# Patient Record
Sex: FEMALE | Race: OTHER | Employment: PART TIME | URBAN - METROPOLITAN AREA
[De-identification: names, ages, dates, MRNs, and addresses within clinical notes are randomized per-mention and may not be internally consistent; named-entity substitution may affect disease eponyms.]

---

## 2019-01-08 ENCOUNTER — OFFICE VISIT (OUTPATIENT)
Dept: FAMILY MEDICINE CLINIC | Age: 64
End: 2019-01-08

## 2019-01-08 VITALS
BODY MASS INDEX: 21.71 KG/M2 | SYSTOLIC BLOOD PRESSURE: 100 MMHG | RESPIRATION RATE: 16 BRPM | WEIGHT: 115 LBS | OXYGEN SATURATION: 95 % | TEMPERATURE: 97.9 F | HEART RATE: 87 BPM | HEIGHT: 61 IN | DIASTOLIC BLOOD PRESSURE: 66 MMHG

## 2019-01-08 DIAGNOSIS — Z00.00 PREVENTATIVE HEALTH CARE: ICD-10-CM

## 2019-01-08 DIAGNOSIS — Z13.228 SCREENING FOR METABOLIC DISORDER: ICD-10-CM

## 2019-01-08 DIAGNOSIS — E78.5 HYPERLIPIDEMIA, UNSPECIFIED HYPERLIPIDEMIA TYPE: ICD-10-CM

## 2019-01-08 DIAGNOSIS — Z76.89 ENCOUNTER TO ESTABLISH CARE: Primary | ICD-10-CM

## 2019-01-08 DIAGNOSIS — Z23 ENCOUNTER FOR IMMUNIZATION: ICD-10-CM

## 2019-01-08 DIAGNOSIS — Z86.39 HISTORY OF VITAMIN D DEFICIENCY: ICD-10-CM

## 2019-01-08 DIAGNOSIS — Z86.39 HISTORY OF THYROID DISEASE: ICD-10-CM

## 2019-01-08 NOTE — PROGRESS NOTES
OFFICE NOTE    Benito Nath is a 58 y.o. female presenting today for office visit. 1/11/2019  2:36 PM    Chief Complaint   Patient presents with    Establish Care       HPI: Patient is coming into the office to establish care with her daughter. Patient previous PCP Dr. Wanda WEEKS. Her previous PCP no longer accept her insurance. Last visit with PCP was 3 months ago. Patient has a history of Trigeminal neuralgia (right side of face) and is followed by Dr. Emiliana Hopper. She states she still has a hard time to swallow sometimes due to the trigeminal neuralgia. Patient states she has hypercholesterol that is diet controlled. She also has takes synthroid for hypothyroidism. No medication refills needed today. Denies chest pain, SOB, fever. Review of Systems   Constitutional: Negative for chills, fatigue and fever. HENT: Negative. Eyes: Negative. Respiratory: Negative for cough, shortness of breath and wheezing. Cardiovascular: Negative for chest pain, palpitations and leg swelling. Gastrointestinal: Negative. Endocrine: Negative. Genitourinary: Negative. Musculoskeletal:        Hands and finger stiffness bilaterally   Skin: Negative. Neurological: Negative for dizziness, tremors, weakness, light-headedness, numbness and headaches. Psychiatric/Behavioral: Negative.           PHQ Screening   PHQ over the last two weeks 1/8/2019   Little interest or pleasure in doing things Not at all   Feeling down, depressed, irritable, or hopeless Not at all   Total Score PHQ 2 0         History  Past Medical History:   Diagnosis Date    High cholesterol     DIET CONTROLLED    Thyroid disease     Trigeminal neuralgia of right side of face October 2014    V3 Branch       Past Surgical History:   Procedure Laterality Date    HX ORTHOPAEDIC      LEFT KNEE       Social History     Socioeconomic History    Marital status:      Spouse name: Not on file    Number of children: Not on file    Years of education: Not on file    Highest education level: Not on file   Social Needs    Financial resource strain: Not on file    Food insecurity - worry: Not on file    Food insecurity - inability: Not on file    Transportation needs - medical: Not on file   [employer] needs - non-medical: Not on file   Occupational History    Not on file   Tobacco Use    Smoking status: Never Smoker    Smokeless tobacco: Never Used   Substance and Sexual Activity    Alcohol use: No    Drug use: No    Sexual activity: Not on file   Other Topics Concern    Not on file   Social History Narrative    Not on file       No family history on file. No Known Allergies    Current Outpatient Medications   Medication Sig Dispense Refill    levothyroxine (SYNTHROID) 25 mcg tablet Take 25 mcg by mouth Daily (before breakfast).  nortriptyline (PAMELOR) 25 mg capsule Take 25 mg by mouth nightly.  temazepam (RESTORIL) 15 mg capsule Take 15 mg by mouth nightly.  raNITIdine hcl 150 mg capsule Take 150 mg by mouth two (2) times a day.  pregabalin (LYRICA) 150 mg capsule Take 150 mg by mouth two (2) times a day.  HYDROcodone-acetaminophen (NORCO) 5-325 mg per tablet Take 1-2 Tabs by mouth every four (4) hours as needed. Max Daily Amount: 12 Tabs. Indications: PAIN 20 Tab 0    pregabalin (LYRICA) 50 mg capsule Take 2 Caps by mouth two (2) times a day. Max Daily Amount: 200 mg. 60 Cap 1    dexamethasone (DECADRON) 2 mg tablet With food. Take 4mg (2 tab) by mouth every 6 hours for 3 doses (3/31 7pm, 4/1 1am, 7am), every 8 hrs x 3 (4/1 2pm, 10pm, 4/2 6am). Then 2mg (1 tab) by mouth every 6 hrs x 4 (4/2 12 noon, 6pm, midnight, 4/3 6am), every 8 hrs x 3 (4/3 2pm, 10pm, 4/4 6am), every 12 hrs x 2 (4/4 6pm, 4/5 6am),then stop. 21 Tab 0    cartilage-collagen II-hyaluron 40-10-3.3 mg tab Take  by mouth.            Health Maintenance reviewed - discussed with patient/daughter    Patient Care Team:  Patient Care Team:  Steve Vaughn MD as PCP - General (Internal Medicine)        LABS/Results:  Results for orders placed or performed during the hospital encounter of 03/29/16   CBC WITH AUTOMATED DIFF   Result Value Ref Range    WBC 9.2 4.6 - 13.2 K/uL    RBC 4.25 4.20 - 5.30 M/uL    HGB 12.3 12.0 - 16.0 g/dL    HCT 37.6 35.0 - 45.0 %    MCV 88.5 74.0 - 97.0 FL    MCH 28.9 24.0 - 34.0 PG    MCHC 32.7 31.0 - 37.0 g/dL    RDW 12.7 11.6 - 14.5 %    PLATELET 395 508 - 721 K/uL    MPV 9.2 9.2 - 11.8 FL    NEUTROPHILS 92 (H) 40 - 73 %    LYMPHOCYTES 7 (L) 21 - 52 %    MONOCYTES 1 (L) 3 - 10 %    EOSINOPHILS 0 0 - 5 %    BASOPHILS 0 0 - 2 %    ABS. NEUTROPHILS 8.5 (H) 1.8 - 8.0 K/UL    ABS. LYMPHOCYTES 0.6 (L) 0.9 - 3.6 K/UL    ABS. MONOCYTES 0.1 0.05 - 1.2 K/UL    ABS. EOSINOPHILS 0.0 0.0 - 0.4 K/UL    ABS. BASOPHILS 0.0 0.0 - 0.06 K/UL    DF AUTOMATED     METABOLIC PANEL, COMPREHENSIVE   Result Value Ref Range    Sodium 143 136 - 145 mmol/L    Potassium 4.1 3.5 - 5.5 mmol/L    Chloride 110 (H) 100 - 108 mmol/L    CO2 25 21 - 32 mmol/L    Anion gap 8 3.0 - 18 mmol/L    Glucose 135 (H) 74 - 99 mg/dL    BUN 13 7.0 - 18 MG/DL    Creatinine 0.79 0.6 - 1.3 MG/DL    BUN/Creatinine ratio 16 12 - 20      GFR est AA >60 >60 ml/min/1.73m2    GFR est non-AA >60 >60 ml/min/1.73m2    Calcium 8.4 (L) 8.5 - 10.1 MG/DL    Bilirubin, total 0.6 0.2 - 1.0 MG/DL    ALT (SGPT) 17 13 - 56 U/L    AST (SGOT) 14 (L) 15 - 37 U/L    Alk.  phosphatase 56 45 - 117 U/L    Protein, total 6.5 6.4 - 8.2 g/dL    Albumin 3.3 (L) 3.4 - 5.0 g/dL    Globulin 3.2 2.0 - 4.0 g/dL    A-G Ratio 1.0 0.8 - 1.7     MAGNESIUM   Result Value Ref Range    Magnesium 2.1 1.8 - 2.4 mg/dL   GLUCOSE, POC   Result Value Ref Range    Glucose (POC) 135 (H) 70 - 110 mg/dL   GLUCOSE, POC   Result Value Ref Range    Glucose (POC) 156 (H) 70 - 110 mg/dL   GLUCOSE, POC   Result Value Ref Range    Glucose (POC) 140 (H) 70 - 110 mg/dL   GLUCOSE, POC   Result Value Ref Range    Glucose (POC) 123 (H) 70 - 110 mg/dL         RADIOLOGY:  None new to review      Physical Exam   Constitutional: She is oriented to person, place, and time. She appears well-developed and well-nourished. HENT:   Right Ear: External ear normal.   Left Ear: External ear normal.   Eyes: Pupils are equal, round, and reactive to light. Neck: Normal range of motion. Neck supple. Cardiovascular: Normal rate, regular rhythm and normal heart sounds. Pulmonary/Chest: Effort normal and breath sounds normal. No respiratory distress. She has no wheezes. She has no rales. Abdominal: Soft. Bowel sounds are normal.   Musculoskeletal: Normal range of motion. Neurological: She is alert and oriented to person, place, and time. She has normal reflexes. Skin: Skin is warm and dry. Psychiatric: She has a normal mood and affect. Vitals:    01/08/19 1429   BP: 100/66   Pulse: 87   Resp: 16   Temp: 97.9 °F (36.6 °C)   TempSrc: Oral   SpO2: 95%   Weight: 115 lb (52.2 kg)   Height: 5' 1\" (1.549 m)   PainSc:   0 - No pain         Assessment and Plan      ICD-10-CM ICD-9-CM    1. Encounter to establish care Z76.89 V65.8 CBC W/O DIFF   2. Preventative health care Z00.00 V70.0 CBC W/O DIFF      METABOLIC PANEL, COMPREHENSIVE      INFLUENZA VIRUS VAC QUAD,SPLIT,PRESV FREE SYRINGE IM      OCCULT BLOOD IMMUNOASSAY,DIAGNOSTIC   3. Screening for metabolic disorder A95.018 V77.99 CBC W/O DIFF      METABOLIC PANEL, COMPREHENSIVE   4. History of thyroid disease Z86.39 V12.29 CBC W/O DIFF      METABOLIC PANEL, COMPREHENSIVE      TSH 3RD GENERATION      T4, FREE   5. Hyperlipidemia, unspecified hyperlipidemia type E78.5 272.4 CBC W/O DIFF      METABOLIC PANEL, COMPREHENSIVE      LIPID PANEL   6. History of vitamin D deficiency Z86.39 V12.1 CBC W/O DIFF      VITAMIN D, 25 HYDROXY   7. Encounter for immunization Z23 V03.89          *Plan of care reviewed with patient. Patient in agreement with plan and expresses understanding.  All questions answered and patient encouraged to call or RTO if further questions or concerns. *After summary care printed, reviewed and given to patient. Follow-up Disposition:  Return in about 3 months (around 4/8/2019).

## 2019-01-08 NOTE — PATIENT INSTRUCTIONS
Hyperlipidemia: After Your Visit  Your Care Instructions  Hyperlipidemia is too much fat in your blood. The body has several kinds of fat, including cholesterol and triglycerides. Your body needs fat for many things, such as making new cells. But too much fat in your blood increases your chances of having a heart attack or stroke. You may be able to lower your cholesterol and triglycerides with a heart-healthy diet, exercise, and if needed, medicine. Your doctor may want you to try lifestyle changes first to see whether they lower the fat in your blood. You may need to take medicine if lifestyle changes do not lower the fat in your blood enough. Follow-up care is a key part of your treatment and safety. Be sure to make and go to all appointments, and call your doctor if you are having problems. Its also a good idea to know your test results and keep a list of the medicines you take. How can you care for yourself at home? Take your medicines  · Take your medicines exactly as prescribed. Call your doctor if you think you are having a problem with your medicine. · If you take medicine to lower your cholesterol, go to follow-up visits. You will need to have blood tests. · Do not take large doses of niacin, which is a B vitamin, while taking medicine called statins. It may increase the chance of muscle pain and liver problems. · Talk to your doctor about avoiding grapefruit juice if you are taking statins. Grapefruit juice can raise the level of this medicine in your blood. This could increase side effects. Eat more fruits, vegetables, and fiber  · Fruits and vegetables have lots of nutrients that help protect against heart disease, and they have little--if any--fat. Try to eat at least five servings a day. Dark green, deep orange, or yellow fruits and vegetables are healthy choices. · Keep carrots, celery, and other veggies handy for snacks.  Buy fruit that is in season and store it where you can see it so that you will be tempted to eat it. Cook dishes that have a lot of veggies in them, such as stir-fries and soups. · Foods high in fiber may reduce your cholesterol and provide important vitamins and minerals. High-fiber foods include whole-grain cereals and breads, oatmeal, beans, brown rice, citrus fruits, and apples. · Buy whole-grain breads and cereals instead of white bread and pastries. Limit saturated fat  · Read food labels and try to avoid saturated fat and trans fat. They increase your risk of heart disease. · Use olive or canola oil when you cook. Try cholesterol-lowering spreads, such as Benecol or Take Control. · Bake, broil, grill, or steam foods instead of frying them. · Limit the amount of high-fat meats you eat, including hot dogs and sausages. Cut out all visible fat when you prepare meat. · Eat fish, skinless poultry, and soy products such as tofu instead of high-fat meats. Soybeans may be especially good for your heart. Eat at least two servings of fish a week. Certain fish, such as salmon, contain omega-3 fatty acids, which may help reduce your risk of heart attack. · Choose low-fat or fat-free milk and dairy products. Get exercise, limit alcohol, and quit smoking  · Get more exercise. Work with your doctor to set up an exercise program. Even if you can do only a small amount, exercise will help you get stronger, have more energy, and manage your weight and your stress. Walking is an easy way to get exercise. Gradually increase the amount you walk every day. Aim for at least 30 minutes on most days of the week. You also may want to swim, bike, or do other activities. · Limit alcohol to no more than 2 drinks a day for men and 1 drink a day for women. · Do not smoke. If you need help quitting, talk to your doctor about stop-smoking programs and medicines. These can increase your chances of quitting for good. When should you call for help?   Call 911 anytime you think you may need emergency care. For example, call if:  · You have symptoms of a heart attack. These may include:  ¨ Chest pain or pressure, or a strange feeling in the chest.  ¨ Sweating. ¨ Shortness of breath. ¨ Nausea or vomiting. ¨ Pain, pressure, or a strange feeling in the back, neck, jaw, or upper belly or in one or both shoulders or arms. ¨ Lightheadedness or sudden weakness. ¨ A fast or irregular heartbeat. After you call 911, the  may tell you to chew 1 adult-strength or 2 to 4 low-dose aspirin. Wait for an ambulance. Do not try to drive yourself. · You have signs of a stroke. These may include:  ¨ Sudden numbness, paralysis, or weakness in your face, arm, or leg, especially on only one side of your body. ¨ New problems with walking or balance. ¨ Sudden vision changes. ¨ Drooling or slurred speech. ¨ New problems speaking or understanding simple statements, or feeling confused. ¨ A sudden, severe headache that is different from past headaches. · You passed out (lost consciousness). Call your doctor now or seek immediate medical care if:  · You have muscle pain or weakness. Watch closely for changes in your health, and be sure to contact your doctor if:  · You are very tired. · You have an upset stomach, gas, constipation, or belly pain or cramps. Where can you learn more? Go to [employer]  Enter C406 in the search box to learn more about \"Hyperlipidemia: After Your Visit. \"   © 8730-2475 Healthwise, Incorporated. Care instructions adapted under license by [facility] (which disclaims liability or warranty for this information). This care instruction is for use with your licensed healthcare professional. If you have questions about a medical condition or this instruction, always ask your healthcare professional. [place] [address] any warranty or liability for your use of this information.   Content Version: 4.5.346091; Last Revised: October 13, 2011

## 2019-01-08 NOTE — PROGRESS NOTES
Chief Complaint   Patient presents with   Ember Ruiz [facility] Care     1. Have you been to the ER, urgent care clinic since your last visit? Hospitalized since your last visit? No    2. Have you seen or consulted any other health care providers outside of the [address] [place] [address] since your last visit? Include any pap smears or colon screening.  Yes, Neurology

## 2019-01-08 NOTE — PROGRESS NOTES
German Silvestre is a 58 y.o. female who presents for routine immunizations. She denies any symptoms , reactions or allergies that would exclude them from being immunized today. Risks and adverse reactions were discussed and the VIS was given to them. All questions were addressed. She was observed for 15 min post injection. There were no reactions observed.     Paula Hart LPN

## 2019-01-09 ENCOUNTER — HOSPITAL ENCOUNTER (OUTPATIENT)
Dept: LAB | Age: 64
Discharge: HOME | End: 2019-01-09
Payer: COMMERCIAL

## 2019-01-09 ENCOUNTER — LAB ONLY (OUTPATIENT)
Dept: FAMILY MEDICINE CLINIC | Age: 64
End: 2019-01-09

## 2019-01-09 DIAGNOSIS — Z86.39 HISTORY OF VITAMIN D DEFICIENCY: ICD-10-CM

## 2019-01-09 DIAGNOSIS — Z13.228 SCREENING FOR METABOLIC DISORDER: ICD-10-CM

## 2019-01-09 DIAGNOSIS — Z76.89 ENCOUNTER TO ESTABLISH CARE: ICD-10-CM

## 2019-01-09 DIAGNOSIS — E78.5 HYPERLIPIDEMIA, UNSPECIFIED HYPERLIPIDEMIA TYPE: ICD-10-CM

## 2019-01-09 DIAGNOSIS — Z86.39 HISTORY OF THYROID DISEASE: ICD-10-CM

## 2019-01-09 DIAGNOSIS — Z01.89 ENCOUNTER FOR LABORATORY TEST: Primary | ICD-10-CM

## 2019-01-09 DIAGNOSIS — Z00.00 PREVENTATIVE HEALTH CARE: ICD-10-CM

## 2019-01-09 LAB
ALBUMIN SERPL-MCNC: 3.8 G/DL (ref 3.4–5)
ALBUMIN/GLOB SERPL: 1.1 {RATIO} (ref 0.8–1.7)
ALP SERPL-CCNC: 60 U/L (ref 45–117)
ALT SERPL-CCNC: 21 U/L (ref 13–56)
ANION GAP SERPL CALC-SCNC: 4 MMOL/L (ref 3–18)
AST SERPL-CCNC: 19 U/L (ref 15–37)
BILIRUB SERPL-MCNC: 1.1 MG/DL (ref 0.2–1)
BUN SERPL-MCNC: 14 MG/DL (ref 7–18)
BUN/CREAT SERPL: 25 (ref 12–20)
CALCIUM SERPL-MCNC: 8.9 MG/DL (ref 8.5–10.1)
CHLORIDE SERPL-SCNC: 109 MMOL/L (ref 100–108)
CHOLEST SERPL-MCNC: 266 MG/DL
CO2 SERPL-SCNC: 28 MMOL/L (ref 21–32)
CREAT SERPL-MCNC: 0.56 MG/DL (ref 0.6–1.3)
ERYTHROCYTE [DISTWIDTH] IN BLOOD BY AUTOMATED COUNT: 12.8 % (ref 11.6–14.5)
GLOBULIN SER CALC-MCNC: 3.5 G/DL (ref 2–4)
GLUCOSE SERPL-MCNC: 91 MG/DL (ref 74–99)
HCT VFR BLD AUTO: 40.2 % (ref 35–45)
HDLC SERPL-MCNC: 57 MG/DL (ref 40–60)
HDLC SERPL: 4.7 {RATIO} (ref 0–5)
HGB BLD-MCNC: 13.1 G/DL (ref 12–16)
LDLC SERPL CALC-MCNC: 184.6 MG/DL (ref 0–100)
LIPID PROFILE,FLP: ABNORMAL
MCH RBC QN AUTO: 29.4 PG (ref 24–34)
MCHC RBC AUTO-ENTMCNC: 32.6 G/DL (ref 31–37)
MCV RBC AUTO: 90.1 FL (ref 74–97)
PLATELET # BLD AUTO: 218 K/UL (ref 135–420)
PMV BLD AUTO: 10.2 FL (ref 9.2–11.8)
POTASSIUM SERPL-SCNC: 3.8 MMOL/L (ref 3.5–5.5)
PROT SERPL-MCNC: 7.3 G/DL (ref 6.4–8.2)
RBC # BLD AUTO: 4.46 M/UL (ref 4.2–5.3)
SODIUM SERPL-SCNC: 141 MMOL/L (ref 136–145)
TRIGL SERPL-MCNC: 122 MG/DL (ref ?–150)
TSH SERPL DL<=0.05 MIU/L-ACNC: 1.7 UIU/ML (ref 0.36–3.74)
VLDLC SERPL CALC-MCNC: 24.4 MG/DL
WBC # BLD AUTO: 5.2 K/UL (ref 4.6–13.2)

## 2019-01-09 PROCEDURE — 82306 VITAMIN D 25 HYDROXY: CPT

## 2019-01-09 PROCEDURE — 85027 COMPLETE CBC AUTOMATED: CPT

## 2019-01-09 PROCEDURE — 80061 LIPID PANEL: CPT

## 2019-01-09 PROCEDURE — 80053 COMPREHEN METABOLIC PANEL: CPT

## 2019-01-09 PROCEDURE — 84443 ASSAY THYROID STIM HORMONE: CPT

## 2019-01-09 PROCEDURE — 82274 ASSAY TEST FOR BLOOD FECAL: CPT

## 2019-01-09 PROCEDURE — 84439 ASSAY OF FREE THYROXINE: CPT

## 2019-01-09 RX ORDER — LEVOTHYROXINE SODIUM 25 UG/1
25 TABLET ORAL
COMMUNITY
End: 2019-04-12

## 2019-01-09 RX ORDER — RANITIDINE 150 MG/1
150 CAPSULE ORAL 2 TIMES DAILY
COMMUNITY
End: 2019-04-09

## 2019-01-09 RX ORDER — NORTRIPTYLINE HYDROCHLORIDE 25 MG/1
25 CAPSULE ORAL
COMMUNITY
End: 2019-04-09

## 2019-01-09 RX ORDER — TEMAZEPAM 15 MG/1
15 CAPSULE ORAL
COMMUNITY
End: 2019-04-09

## 2019-01-09 RX ORDER — PREGABALIN 150 MG/1
150 CAPSULE ORAL 2 TIMES DAILY
COMMUNITY
End: 2019-04-09

## 2019-01-09 NOTE — PROGRESS NOTES
Patient presents for lab draw ordered by:    Ordering Provider: Dolores Tafoya   Ordering Department/Practice:  [facility]  Phone:  [phone]  Date Ordered:  01/08/2019    The following labs were drawn and sent to [facility] lab at [facility] by Matilde Rodriguez LPN:    CBC, Lipid Profile, CMP, TSH, 3rd Generation and Vit D T4,Free    The following tubes were sent:    Lavender  ( 1)   Gel  2      Pt in for lab visit. Venipuncture done in right arm. Blood specimen obtained. Pt tolerated well. No comps.

## 2019-01-09 NOTE — PROGRESS NOTES
Pt also brought in a list of her medication that she is currently taken. Medications added to her medication profile.

## 2019-01-10 LAB
25(OH)D3 SERPL-MCNC: 34.4 NG/ML (ref 30–100)
T4 FREE SERPL-MCNC: 0.9 NG/DL (ref 0.7–1.5)

## 2019-01-11 PROBLEM — E03.9 ACQUIRED HYPOTHYROIDISM: Status: ACTIVE | Noted: 2019-01-11

## 2019-01-11 LAB — HEMOCCULT STL QL IA: NEGATIVE

## 2019-04-09 ENCOUNTER — OFFICE VISIT (OUTPATIENT)
Dept: FAMILY MEDICINE CLINIC | Age: 64
End: 2019-04-09

## 2019-04-09 VITALS
WEIGHT: 118 LBS | SYSTOLIC BLOOD PRESSURE: 100 MMHG | HEIGHT: 61 IN | RESPIRATION RATE: 20 BRPM | BODY MASS INDEX: 22.28 KG/M2 | TEMPERATURE: 97.7 F | HEART RATE: 65 BPM | OXYGEN SATURATION: 97 % | DIASTOLIC BLOOD PRESSURE: 59 MMHG

## 2019-04-09 DIAGNOSIS — E03.9 ACQUIRED HYPOTHYROIDISM: ICD-10-CM

## 2019-04-09 DIAGNOSIS — E78.00 PURE HYPERCHOLESTEROLEMIA: Primary | ICD-10-CM

## 2019-04-09 DIAGNOSIS — G50.0 TRIGEMINAL NEURALGIA: ICD-10-CM

## 2019-04-09 DIAGNOSIS — R35.0 URINARY FREQUENCY: ICD-10-CM

## 2019-04-09 DIAGNOSIS — Z01.89 ENCOUNTER FOR LABORATORY EXAMINATION: ICD-10-CM

## 2019-04-09 LAB
BILIRUB UR QL STRIP: NEGATIVE
GLUCOSE UR-MCNC: NEGATIVE MG/DL
KETONES P FAST UR STRIP-MCNC: NEGATIVE MG/DL
PH UR STRIP: 5 [PH] (ref 4.6–8)
PROT UR QL STRIP: NEGATIVE
SP GR UR STRIP: 1 (ref 1–1.03)
UA UROBILINOGEN AMB POC: NORMAL (ref 0.2–1)
URINALYSIS CLARITY POC: CLEAR
URINALYSIS COLOR POC: YELLOW
URINE BLOOD POC: NORMAL
URINE LEUKOCYTES POC: NEGATIVE
URINE NITRITES POC: NEGATIVE

## 2019-04-09 RX ORDER — OFLOXACIN 3 MG/ML
SOLUTION/ DROPS OPHTHALMIC
COMMUNITY
Start: 2019-03-13 | End: 2019-04-09

## 2019-04-09 RX ORDER — KETOROLAC TROMETHAMINE 5 MG/ML
SOLUTION OPHTHALMIC
COMMUNITY
Start: 2019-03-13 | End: 2020-07-20

## 2019-04-09 NOTE — PROGRESS NOTES
OFFICE NOTE    Denilson Wilder is a 58 y.o. female presenting today for office visit. 4/9/2019  2:24 PM    Chief Complaint   Patient presents with    Cholesterol Problem       HPI: Here today transitioning care from Janet Dumont NP since her departure from office. Newer patient to practice- previous PCP Dr Rosanna Cannon. Last routine labs completed 1/2019. Follows with neurology (right sided trigeminal neuralgia- takes Lyrica). Upcoming appointment to see GYN in May. Hyperlipidemia: Not taking any medications at this time.  1/2019. Has not been making many lifestyle changes. Hypothyroidism: Has not been taking Synthroid at this time for a few months. TSH WNL 1/2019- pretty sure she was on it at that time but daughter is not really sure. Denies any complaints related to. Complains of urinary frequency that has been occurring, especially at HS- has been urinating about 4-5 times per night. Denies any burning, back pain, vaginal symptoms. Review of Systems   Constitutional: Negative for chills, fatigue and fever. Respiratory: Negative for cough, shortness of breath and wheezing. Cardiovascular: Negative for chest pain, palpitations and leg swelling. Gastrointestinal: Negative for abdominal pain, constipation, diarrhea, nausea and vomiting. Genitourinary: Positive for frequency. Negative for difficulty urinating, dysuria, flank pain, hematuria and vaginal discharge. Musculoskeletal: Negative for arthralgias and myalgias. Skin: Negative for rash. Neurological: Negative for dizziness and headaches.          PHQ Screening   3 most recent PHQ Screens 1/8/2019   Little interest or pleasure in doing things Not at all   Feeling down, depressed, irritable, or hopeless Not at all   Total Score PHQ 2 0         History  Past Medical History:   Diagnosis Date    High cholesterol     Hypothyroidism     Trigeminal neuralgia of right side of face October 2014    V3 Branch       Past Surgical History:   Procedure Laterality Date    HX ORTHOPAEDIC      LEFT KNEE       Social History     Socioeconomic History    Marital status:      Spouse name: Not on file    Number of children: Not on file    Years of education: Not on file    Highest education level: Not on file   Occupational History    Not on file   Social Needs    Financial resource strain: Not on file    Food insecurity:     Worry: Not on file     Inability: Not on file    Transportation needs:     Medical: Not on file     Non-medical: Not on file   Tobacco Use    Smoking status: Never Smoker    Smokeless tobacco: Never Used   Substance and Sexual Activity    Alcohol use: No    Drug use: No    Sexual activity: Not on file   Lifestyle    Physical activity:     Days per week: Not on file     Minutes per session: Not on file    Stress: Not on file   Relationships    Social connections:     Talks on phone: Not on file     Gets together: Not on file     Attends [employer] service: Not on file     Active member of club or organization: Not on file     Attends meetings of clubs or organizations: Not on file     Relationship status: Not on file    Intimate partner violence:     Fear of current or ex partner: Not on file     Emotionally abused: Not on file     Physically abused: Not on file     Forced sexual activity: Not on file   Other Topics Concern    Not on file   Social History Narrative    Not on file       No family history on file. No Known Allergies    Current Outpatient Medications   Medication Sig Dispense Refill    ketorolac (ACULAR) 0.5 % ophthalmic solution       pregabalin (LYRICA) 50 mg capsule Take 2 Caps by mouth two (2) times a day. Max Daily Amount: 200 mg. 60 Cap 1    levothyroxine (SYNTHROID) 25 mcg tablet- NOT TAKING AT THIS TIME Take 25 mcg by mouth Daily (before breakfast).              Advance Care Planning:   Patient was offered the opportunity to discuss advance care planning NO   Does patient have an Advance Directive: If no, did you provide information on Caring Connections? Patient Care Team:  Patient Care Team:  Ivonne Rosado NP as PCP - General (Nurse Practitioner)  Yvon Ramirez MD (Neurology)  Dell Mccurdy MD (Gynecology)        LABS:    Results for orders placed or performed in visit on 04/09/19   AMB POC URINALYSIS DIP STICK AUTO W/O MICRO   Result Value Ref Range    Color (UA POC) Yellow     Clarity (UA POC) Clear     Glucose (UA POC) Negative Negative    Bilirubin (UA POC) Negative Negative    Ketones (UA POC) Negative Negative    Specific gravity (UA POC) 1.005 1.001 - 1.035    Blood (UA POC) Trace Negative    pH (UA POC) 5.0 4.6 - 8.0    Protein (UA POC) Negative Negative    Urobilinogen (UA POC) 0.2 mg/dL 0.2 - 1    Nitrites (UA POC) Negative Negative    Leukocyte esterase (UA POC) Negative Negative       Results for orders placed or performed during the hospital encounter of 01/09/19   CBC W/O DIFF   Result Value Ref Range    WBC 5.2 4.6 - 13.2 K/uL    RBC 4.46 4.20 - 5.30 M/uL    HGB 13.1 12.0 - 16.0 g/dL    HCT 40.2 35.0 - 45.0 %    MCV 90.1 74.0 - 97.0 FL    MCH 29.4 24.0 - 34.0 PG    MCHC 32.6 31.0 - 37.0 g/dL    RDW 12.8 11.6 - 14.5 %    PLATELET 824 535 - 122 K/uL    MPV 10.2 9.2 - 25.1 FL   METABOLIC PANEL, COMPREHENSIVE   Result Value Ref Range    Sodium 141 136 - 145 mmol/L    Potassium 3.8 3.5 - 5.5 mmol/L    Chloride 109 (H) 100 - 108 mmol/L    CO2 28 21 - 32 mmol/L    Anion gap 4 3.0 - 18 mmol/L    Glucose 91 74 - 99 mg/dL    BUN 14 7.0 - 18 MG/DL    Creatinine 0.56 (L) 0.6 - 1.3 MG/DL    BUN/Creatinine ratio 25 (H) 12 - 20      GFR est AA >60 >60 ml/min/1.73m2    GFR est non-AA >60 >60 ml/min/1.73m2    Calcium 8.9 8.5 - 10.1 MG/DL    Bilirubin, total 1.1 (H) 0.2 - 1.0 MG/DL    ALT (SGPT) 21 13 - 56 U/L    AST (SGOT) 19 15 - 37 U/L    Alk.  phosphatase 60 45 - 117 U/L    Protein, total 7.3 6.4 - 8.2 g/dL    Albumin 3.8 3.4 - 5.0 g/dL    Globulin 3.5 2.0 - 4.0 g/dL A-G Ratio 1.1 0.8 - 1.7     VITAMIN D, 25 HYDROXY   Result Value Ref Range    Vitamin D 25-Hydroxy 34.4 30 - 100 ng/mL   TSH 3RD GENERATION   Result Value Ref Range    TSH 1.70 0.36 - 3.74 uIU/mL   T4, FREE   Result Value Ref Range    T4, Free 0.9 0.7 - 1.5 NG/DL   LIPID PANEL   Result Value Ref Range    LIPID PROFILE          Cholesterol, total 266 (H) <200 MG/DL    Triglyceride 122 <150 MG/DL    HDL Cholesterol 57 40 - 60 MG/DL    LDL, calculated 184.6 (H) 0 - 100 MG/DL    VLDL, calculated 24.4 MG/DL    CHOL/HDL Ratio 4.7 0 - 5.0     OCCULT BLOOD IMMUNOASSAY,DIAGNOSTIC   Result Value Ref Range    Occult blood fecal, by IA NEGATIVE  NEGATIVE         RADIOLOGY:  None new to review      Physical Exam   Constitutional: She is oriented to person, place, and time. She appears well-developed and well-nourished. No distress. Neck: Normal range of motion. Neck supple. No thyromegaly present. Cardiovascular: Normal rate, regular rhythm and normal heart sounds. No murmur heard. Pulmonary/Chest: Effort normal and breath sounds normal. No respiratory distress. Abdominal: Soft. Bowel sounds are normal. There is no tenderness. Musculoskeletal: She exhibits no edema. Neurological: She is alert and oriented to person, place, and time. She exhibits normal muscle tone. Coordination and gait normal.   Skin: Skin is warm and dry. Vitals:    04/09/19 1424   BP: 100/59   Pulse: 65   Resp: 20   Temp: 97.7 °F (36.5 °C)   TempSrc: Oral   SpO2: 97%   Weight: 118 lb (53.5 kg)   Height: 5' 1\" (1.549 m)   PainSc:   0 - No pain         Assessment and Plan    Pure hypercholesterolemia  *Noted in past. Advised on lifestyle changes and will check again in the fall    Acquired hypothyroidism  *Not on Synthroid for unknown reasons. Check labs since she has been off a few months. - T4, FREE; Future  - TSH 3RD GENERATION; Future    Trigeminal neuralgia  *Continue with neurology. Continue with Lyrica.     Urinary frequency  *U/A essentially normal today except for low specific gravity- daughter states that sometimes that patient drinks almost a gallon of water per day- advised on possibly reducing this some or at least not drinking after dinner time. Will send for culture. If not improved with some slight water intake decrease, will consider adding OAB medication.   - CULTURE, URINE; Future  - AMB POC URINALYSIS DIP STICK AUTO W/O MICRO  - CULTURE, URINE    Encounter for laboratory examination  - COLLECTION VENOUS BLOOD,VENIPUNCTURE        *Plan of care reviewed with patient. Patient in agreement with plan and expresses understanding. All questions answered and patient encouraged to call or RTO if further questions or concerns. Follow-up and Dispositions    · Return in about 6 months (around 10/9/2019) for chronic disease routine care- 15 min.

## 2019-04-09 NOTE — PATIENT INSTRUCTIONS
Learning About High Cholesterol  What is high cholesterol? Cholesterol is a type of fat in your blood. It is needed for many body functions, such as making new cells. Cholesterol is made by your body. It also comes from food you eat. If you have too much cholesterol, it starts to build up in your arteries. This is called hardening of the arteries, or atherosclerosis. High cholesterol raises your risk of a heart attack and stroke. There are different types of cholesterol. LDL is the \"bad\" cholesterol. High LDL can raise your risk for heart disease, heart attack, and stroke. HDL is the \"good\" cholesterol. High HDL is linked with a lower risk for heart disease, heart attack, and stroke. Your cholesterol levels help your doctor find out your risk for having a heart attack or stroke. How can you prevent high cholesterol? A heart-healthy lifestyle can help you prevent high cholesterol. This lifestyle helps lower your risk for a heart attack and stroke. · Eat heart-healthy foods. ? Eat fruits, vegetables, whole grains (like oatmeal), dried beans and peas, nuts and seeds, soy products (like tofu), and fat-free or low-fat dairy products. ? Replace butter, margarine, and hydrogenated or partially hydrogenated oils with olive and canola oils. (Canola oil margarine without trans fat is fine.)  ? Replace red meat with fish, poultry, and soy protein (like tofu). ? Limit processed and packaged foods like chips, crackers, and cookies. · Be active. Exercise can improve your cholesterol level. Get at least 30 minutes of exercise on most days of the week. Walking is a good choice. You also may want to do other activities, such as running, swimming, cycling, or playing tennis or team sports. · Stay at a healthy weight. Lose weight if you need to. · Don't smoke. If you need help quitting, talk to your doctor about stop-smoking programs and medicines. These can increase your chances of quitting for good.   How is high cholesterol treated? The goal of treatment is to reduce your chances of having a heart attack or stroke. The goal is not to lower your cholesterol numbers only. · You may make lifestyle changes, such as eating healthy foods, not smoking, losing weight, and being more active. · You may have to take medicine. Follow-up care is a key part of your treatment and safety. Be sure to make and go to all appointments, and call your doctor if you are having problems. It's also a good idea to know your test results and keep a list of the medicines you take. Where can you learn more? Go to http://jose-kaushik.info/. Enter H105 in the search box to learn more about \"Learning About High Cholesterol. \"  Current as of: July 22, 2018  Content Version: 11.9  © 0138-1610 [employer]. Care instructions adapted under license by [employer] (which disclaims liability or warranty for this information). If you have questions about a medical condition or this instruction, always ask your healthcare professional. [address] any warranty or liability for your use of this information.

## 2019-04-12 ENCOUNTER — TELEPHONE (OUTPATIENT)
Dept: FAMILY MEDICINE CLINIC | Age: 64
End: 2019-04-12

## 2019-04-12 DIAGNOSIS — N39.0 URINARY TRACT INFECTION WITHOUT HEMATURIA, SITE UNSPECIFIED: Primary | ICD-10-CM

## 2019-04-12 LAB
BACTERIA UR CULT: ABNORMAL
T4 FREE SERPL-MCNC: 1.07 NG/DL (ref 0.82–1.77)
TSH SERPL DL<=0.005 MIU/L-ACNC: 2.5 UIU/ML (ref 0.45–4.5)

## 2019-04-12 RX ORDER — NITROFURANTOIN 25; 75 MG/1; MG/1
100 CAPSULE ORAL 2 TIMES DAILY
Qty: 10 CAP | Refills: 0 | Status: SHIPPED | OUTPATIENT
Start: 2019-04-12 | End: 2019-04-17

## 2019-04-12 NOTE — TELEPHONE ENCOUNTER
2019  5:59 PM    Chief Complaint   Patient presents with    Results     Thyroid and urine culture results     Noted normal thyroid findings- off Synthroid- can continue to be off for now. UTI noted on culture- will treat with Macrobid. Called daughter at this time- verified patient by name and . Advised on findings. She expressed understanding.        Results for orders placed or performed in visit on 19   CULTURE, URINE   Result Value Ref Range    Urine Culture, Routine (A)      Escherichia coli  50,000-100,000 colony forming units per mL         Susceptibility    Escherichia coli -  (no method available)*     Amoxicillin/Clavulanic A S Susceptible ug/mL     Ampicillin ($) R Resistant ug/mL     Cefepime ($$) S Susceptible ug/mL     Ceftriaxone ($) S Susceptible ug/mL     Cefuroxime ($) S Susceptible ug/mL     Ciprofloxacin ($) R Resistant ug/mL     Ertapenem ($$$$) S Susceptible ug/mL     Gentamicin ($) S Susceptible ug/mL     Imipenem S Susceptible ug/mL     Levofloxacin ($) R Resistant ug/mL     Meropenem ($$) S Susceptible ug/mL     Nitrofurantoin S Susceptible ug/mL     Piperacillin/Tazobac ($) S Susceptible ug/mL     Tetracycline S Susceptible ug/mL     Tobramycin ($) S Susceptible ug/mL     Trimeth-Sulfamethoxa S Susceptible ug/mL   TSH 3RD GENERATION   Result Value Ref Range    TSH 2.500 0.450 - 4.500 uIU/mL   T4, FREE   Result Value Ref Range    T4, Free 1.07 0.82 - 1.77 ng/dL

## 2020-07-20 ENCOUNTER — OFFICE VISIT (OUTPATIENT)
Dept: FAMILY MEDICINE CLINIC | Age: 65
End: 2020-07-20

## 2020-07-20 VITALS
HEART RATE: 60 BPM | RESPIRATION RATE: 16 BRPM | WEIGHT: 111 LBS | TEMPERATURE: 97.6 F | HEIGHT: 61 IN | BODY MASS INDEX: 20.96 KG/M2 | SYSTOLIC BLOOD PRESSURE: 106 MMHG | DIASTOLIC BLOOD PRESSURE: 65 MMHG | OXYGEN SATURATION: 95 %

## 2020-07-20 DIAGNOSIS — E78.00 PURE HYPERCHOLESTEROLEMIA: ICD-10-CM

## 2020-07-20 DIAGNOSIS — Z11.59 NEED FOR HEPATITIS C SCREENING TEST: ICD-10-CM

## 2020-07-20 DIAGNOSIS — E03.9 ACQUIRED HYPOTHYROIDISM: Primary | ICD-10-CM

## 2020-07-20 DIAGNOSIS — Z23 ENCOUNTER FOR IMMUNIZATION: ICD-10-CM

## 2020-07-20 DIAGNOSIS — Z13.0 SCREENING FOR ENDOCRINE, METABOLIC AND IMMUNITY DISORDER: ICD-10-CM

## 2020-07-20 DIAGNOSIS — K30 INDIGESTION: ICD-10-CM

## 2020-07-20 DIAGNOSIS — Z13.29 SCREENING FOR ENDOCRINE, METABOLIC AND IMMUNITY DISORDER: ICD-10-CM

## 2020-07-20 DIAGNOSIS — Z13.228 SCREENING FOR ENDOCRINE, METABOLIC AND IMMUNITY DISORDER: ICD-10-CM

## 2020-07-20 RX ORDER — LANOLIN ALCOHOL/MO/W.PET/CERES
1 CREAM (GRAM) TOPICAL DAILY
COMMUNITY

## 2020-07-20 RX ORDER — CHLORHEXIDINE GLUCONATE 1.2 MG/ML
RINSE ORAL
COMMUNITY
Start: 2020-07-06

## 2020-07-20 NOTE — PROGRESS NOTES
Patient presents for lab draw ordered by Estefania Montilla NP  Ordering Department/Practice:  [facility]  Date Ordered:  7-     The following labs were drawn and sent to [facility]     CBC, Lipid Profile, CMP, TSH, 3rd Generation and Hepatitis C AB    The following tubes were sent:    Gold  ( 2) and Lavender  ( 1)    Draw site:  LAC  Pain Level:0  Needle Gauge23  Aseptic technique used  Blood thinners:n  Band-Aid applied  Draw fee added   Procedure tolerated well, patient voiced no complaints.

## 2020-07-20 NOTE — PROGRESS NOTES
After obtaining consent, and per orders of Chey Cardoso NP, injection of Pneumonavax 23 and Tetanus was given by Geovani Henning. Patient instructed to remain in clinic for 20 minutes afterwards, and to report any adverse reaction to me immediately.

## 2020-07-20 NOTE — PROGRESS NOTES
OFFICE NOTE (SOAP)      7/20/2020  11:42 AM    Chief Complaint   Patient presents with    Sore Throat     sore throat for about 4 or 5 days pt denies cough sob headache        SUBJECTIVE:    HPI:   Marci Desnon is a 71 y.o. female presenting today for office visit. Former patient of Leno Toledo, NP here today for routine care office visit. Last laps Jan and April 2019. Follows with OBGYN Dr. Kim Javier at [facility] and opthalmology Dr. Chalino Ngo, as well as dentist.      Hyperlipidemia: noted on previous lab work. Not taking any medications at this time. Last lipids 184. Still, relatively low risk- ACC/AHA heart risk calculator 10 year risk 4.5%- no statin indicated. Will get updated labs today. Lab Results   Component Value Date/Time    Cholesterol, total 266 (H) 01/09/2019 10:58 AM    HDL Cholesterol 57 01/09/2019 10:58 AM    LDL, calculated 184.6 (H) 01/09/2019 10:58 AM    VLDL, calculated 24.4 01/09/2019 10:58 AM    Triglyceride 122 01/09/2019 10:58 AM    CHOL/HDL Ratio 4.7 01/09/2019 10:58 AM     Hypothyroidism: Has not been taking Synthroid for over a year. Last TSH check WNL. Will get updated labs today. Lab Results   Component Value Date/Time    TSH 2.500 04/09/2019 03:18 PM     Sore throat- sick complaint today noted after rooming patient- states sore throat for past 4-5 days, no cough, no SOB, no headache. Took zinc and chinese herbs for this which helped. A little better today. Explained due to [place] 19 pandemic actually not doing strep tests in this office at the moment- [facility]. She declined. She will let me know if symptoms persist or worsen and referral can be set up then. Indigestion- patient reports intermittent 'sour stomach', especially after eating. Family member recently treated for h pylori so she wants h pylori breath test today. Taking japanese herbs for symptomatic relief as needed.      HM-  Labs today  Vaccines- PPSV 23, Tdap- done today  Mammogram/DEXA - done at [facility], result was good. Request results. OBGYN at [facility]. Dr. Kenny Paul cancer screen - daughter reports colonoscopy with GLST at [facility] [address] about 3 years ago, reports normal  Eye exam- supposed to follow every 3 months, Dr. Michael Dominguez. Review of Systems   Constitutional: Negative for chills, diaphoresis and fever. HENT: Positive for sore throat. Negative for congestion, ear pain, facial swelling, sinus pressure, sinus pain, sneezing and trouble swallowing. Respiratory: Negative for cough, shortness of breath and wheezing. Gastrointestinal: Positive for abdominal pain. Negative for abdominal distention, anal bleeding, blood in stool, constipation, diarrhea, nausea, rectal pain and vomiting. Endocrine: Negative for polydipsia, polyphagia and polyuria. Genitourinary: Positive for frequency. Negative for difficulty urinating, dysuria, pelvic pain and urgency. Night time frequency and urgency    Musculoskeletal: Negative for arthralgias, gait problem and joint swelling. Skin: Negative for color change and rash. Neurological: Negative for dizziness, syncope, speech difficulty, weakness, light-headedness and headaches. Psychiatric/Behavioral: Negative for agitation, dysphoric mood, sleep disturbance and suicidal ideas. The patient is not nervous/anxious and is not hyperactive.           PHQ Screening   3 most recent PHQ Screens 7/20/2020   Little interest or pleasure in doing things Not at all   Feeling down, depressed, irritable, or hopeless Not at all   Total Score PHQ 2 0         History  Past Medical History:   Diagnosis Date    High cholesterol     Hypothyroidism     Trigeminal neuralgia of right side of face October 2014    V3 Branch       Past Surgical History:   Procedure Laterality Date    HX ORTHOPAEDIC      LEFT KNEE       Social History     Socioeconomic History    Marital status:      Spouse name: Not on file    Number of children: Not on file    Years of education: Not on file    Highest education level: Not on file   Occupational History    Not on file   Social Needs    Financial resource strain: Not on file    Food insecurity     Worry: Not on file     Inability: Not on file    Transportation needs     Medical: Not on file     Non-medical: Not on file   Tobacco Use    Smoking status: Never Smoker    Smokeless tobacco: Never Used   Substance and Sexual Activity    Alcohol use: No    Drug use: No    Sexual activity: Not on file   Lifestyle    Physical activity     Days per week: Not on file     Minutes per session: Not on file    Stress: Not on file   Relationships    Social connections     Talks on phone: Not on file     Gets together: Not on file     Attends Oriental orthodox service: Not on file     Active member of club or organization: Not on file     Attends meetings of clubs or organizations: Not on file     Relationship status: Not on file    Intimate partner violence     Fear of current or ex partner: Not on file     Emotionally abused: Not on file     Physically abused: Not on file     Forced sexual activity: Not on file   Other Topics Concern    Not on file   Social History Narrative    Not on file       History reviewed. No pertinent family history. No Known Allergies    Current Outpatient Medications   Medication Sig Dispense Refill    ketorolac (ACULAR) 0.5 % ophthalmic solution       pregabalin (LYRICA) 50 mg capsule Take 2 Caps by mouth two (2) times a day.  Max Daily Amount: 200 mg. 60 Cap 1       Patient Care Team:  Patient Care Team:  Ashley Collins NP as PCP - General (Nurse Practitioner)  Ashley Collins NP as PCP [facility] Empaneled Provider  Debbie Liu MD (Neurology)  Dell Mccurdy MD (Gynecology)      LABS:  Lab Results   Component Value Date/Time    Cholesterol, total 266 (H) 01/09/2019 10:58 AM    HDL Cholesterol 57 01/09/2019 10:58 AM    LDL, calculated 184.6 (H) 01/09/2019 10:58 AM    VLDL, calculated 24.4 01/09/2019 10:58 AM    Triglyceride 122 01/09/2019 10:58 AM    CHOL/HDL Ratio 4.7 01/09/2019 10:58 AM     Lab Results   Component Value Date/Time    TSH 2.500 04/09/2019 03:18 PM     Lab Results   Component Value Date/Time    WBC 5.2 01/09/2019 10:58 AM    HGB 13.1 01/09/2019 10:58 AM    HCT 40.2 01/09/2019 10:58 AM    PLATELET 256 70/85/3658 10:58 AM    MCV 90.1 01/09/2019 10:58 AM     Lab Results   Component Value Date/Time    Sodium 141 01/09/2019 10:58 AM    Potassium 3.8 01/09/2019 10:58 AM    Chloride 109 (H) 01/09/2019 10:58 AM    CO2 28 01/09/2019 10:58 AM    Anion gap 4 01/09/2019 10:58 AM    Glucose 91 01/09/2019 10:58 AM    BUN 14 01/09/2019 10:58 AM    Creatinine 0.56 (L) 01/09/2019 10:58 AM    BUN/Creatinine ratio 25 (H) 01/09/2019 10:58 AM    GFR est AA >60 01/09/2019 10:58 AM    GFR est non-AA >60 01/09/2019 10:58 AM    Calcium 8.9 01/09/2019 10:58 AM    Bilirubin, total 1.1 (H) 01/09/2019 10:58 AM    Alk. phosphatase 60 01/09/2019 10:58 AM    Protein, total 7.3 01/09/2019 10:58 AM    Albumin 3.8 01/09/2019 10:58 AM    Globulin 3.5 01/09/2019 10:58 AM    A-G Ratio 1.1 01/09/2019 10:58 AM    ALT (SGPT) 21 01/09/2019 10:58 AM    AST (SGOT) 19 01/09/2019 10:58 AM       RADIOLOGY:  None new to review      OBJECTIVE:      Physical Exam  Vitals signs and nursing note reviewed. Constitutional:       Appearance: She is well-developed. Neck:      Musculoskeletal: Neck supple. Thyroid: No thyromegaly. Cardiovascular:      Rate and Rhythm: Normal rate and regular rhythm. Heart sounds: No murmur. Pulmonary:      Effort: Pulmonary effort is normal. No respiratory distress. Breath sounds: Normal breath sounds. No wheezing. Musculoskeletal: Normal range of motion. Skin:     General: Skin is warm and dry. Neurological:      Mental Status: She is alert and oriented to person, place, and time. Motor: No weakness.       Gait: Gait normal.   Psychiatric:         Mood and Affect: Mood normal. Behavior: Behavior normal.           Vitals:    07/20/20 1135 07/20/20 1139   BP:  106/65   Pulse:  60   Resp:  16   Temp:  97.6 °F (36.4 °C)   TempSrc:  Oral   SpO2:  95%   Weight:  111 lb (50.3 kg)   Height: (P) 5' 1\" (1.549 m) 5' 1\" (1.549 m)   PainSc:    2   PainLoc:  Throat         Assessment & Plan:    1. Acquired hypothyroidism  Collected today  - TSH 3RD GENERATION; Future    2. Pure hypercholesterolemia  Collected today, discussed low risk, minimal need for statin  - LIPID PANEL; Future    3. Indigestion  Discussed diet modifications   - AMB POC HELICOBACTER PYLORI    4. Screening for endocrine, metabolic and immunity disorder  Done today  - CBC W/O DIFF; Future  - METABOLIC PANEL, COMPREHENSIVE; Future  - COLLECTION VENOUS BLOOD,VENIPUNCTURE; Future  - LIPID PANEL; Future  - TSH 3RD GENERATION; Future  - URINALYSIS W/MICROSCOPIC; Future    5. Need for hepatitis C screening test  Done today  - HEPATITIS C AB; Future    6.  Encounter for immunization  Done today  - PNEUMOCOCCAL POLYSACCHARIDE VACCINE, 23-VALENT, ADULT OR IMMUNOSUPPRESSED PT DOSE,  - TETANUS, DIPHTHERIA TOXOIDS AND ACELLULAR PERTUSSIS VACCINE (TDAP), IN INDIVIDS. >=7, IM  - IN IMMUNIZ ADMIN,1 SINGLE/COMB VAC/TOXOID        Orders Placed This Encounter    IN IMMUNIZ ADMIN,1 SINGLE/COMB VAC/TOXOID    PNEUMOCOCCAL POLYSACCHARIDE VACCINE, 23-VALENT, ADULT OR IMMUNOSUPPRESSED PT DOSE,    TETANUS, DIPHTHERIA TOXOIDS AND ACELLULAR PERTUSSIS VACCINE (TDAP), IN INDIVIDS. >=7, IM    CBC W/O DIFF     Standing Status:   Future     Standing Expiration Date:   3/30/4346    METABOLIC PANEL, COMPREHENSIVE     Standing Status:   Future     Standing Expiration Date:   1/16/2021    LIPID PANEL     Standing Status:   Future     Standing Expiration Date:   1/16/2021    TSH 3RD GENERATION     Standing Status:   Future     Standing Expiration Date:   1/16/2021    URINALYSIS W/MICROSCOPIC     Standing Status:   Future     Standing Expiration Date:   1/16/2021  HEPATITIS C AB     Standing Status:   Future     Standing Expiration Date:   54/23/0820    AMB POC HELICOBACTER PYLORI    COLLECTION VENOUS BLOOD,VENIPUNCTURE     Standing Status:   Future     Standing Expiration Date:   1/16/2021    chlorhexidine (PERIDEX) 0.12 % solution     Sig: RINSE WITH 5ML FOR 30 SECONDS TWICE A DAY OR AS NEEDED    glucosamine-chondroitin (ARTHX) 500-400 mg cap     Sig: Take 1 Cap by mouth daily. Follow-up and Dispositions    · Return in about 6 months (around 1/20/2021), or if symptoms worsen or fail to improve, for routine care with [facility] . Plan of care reviewed with patient. Patient in agreement with plan and expresses understanding. I have discussed when to anticipate results and how results will be communicated, if applicable. Anticipatory guidance given and questions answered, patient encouraged to call or RTO if further questions or concerns.     Rick Palm NP  07/20/20

## 2020-07-20 NOTE — PROGRESS NOTES
After obtaining consent, and per orders of Laura Lancaster, injection of Pneumovax and Tetnus was given by Lyle Linares. Patient instructed to remain in clinic for 20 minutes afterwards, and to report any adverse reaction to me immediately.

## 2020-07-20 NOTE — Clinical Note
Request results from-   OBGYN (looking for last mammogram and DEXA)- Dr. Kim Rick  Ophthalmology- Dr. Hector Pimentel in [place]

## 2020-07-21 LAB
ALBUMIN SERPL-MCNC: 4.5 G/DL (ref 3.8–4.8)
ALBUMIN/GLOB SERPL: 1.8 {RATIO} (ref 1.2–2.2)
ALP SERPL-CCNC: 64 IU/L (ref 39–117)
ALT SERPL-CCNC: 13 IU/L (ref 0–32)
APPEARANCE UR: CLEAR
AST SERPL-CCNC: 21 IU/L (ref 0–40)
BACTERIA #/AREA URNS HPF: NORMAL /[HPF]
BILIRUB SERPL-MCNC: 0.7 MG/DL (ref 0–1.2)
BILIRUB UR QL STRIP: NEGATIVE
BUN SERPL-MCNC: 12 MG/DL (ref 8–27)
BUN/CREAT SERPL: 20 (ref 12–28)
CALCIUM SERPL-MCNC: 9.5 MG/DL (ref 8.7–10.3)
CASTS URNS QL MICRO: NORMAL /LPF
CHLORIDE SERPL-SCNC: 108 MMOL/L (ref 96–106)
CHOLEST SERPL-MCNC: 221 MG/DL (ref 100–199)
CO2 SERPL-SCNC: 23 MMOL/L (ref 20–29)
COLOR UR: YELLOW
CREAT SERPL-MCNC: 0.6 MG/DL (ref 0.57–1)
EPI CELLS #/AREA URNS HPF: NORMAL /HPF (ref 0–10)
ERYTHROCYTE [DISTWIDTH] IN BLOOD BY AUTOMATED COUNT: 13.1 % (ref 11.7–15.4)
GLOBULIN SER CALC-MCNC: 2.5 G/DL (ref 1.5–4.5)
GLUCOSE SERPL-MCNC: 85 MG/DL (ref 65–99)
GLUCOSE UR QL: NEGATIVE
HCT VFR BLD AUTO: 40.9 % (ref 34–46.6)
HCV AB S/CO SERPL IA: 0.1 S/CO RATIO (ref 0–0.9)
HDLC SERPL-MCNC: 59 MG/DL
HGB BLD-MCNC: 12.8 G/DL (ref 11.1–15.9)
HGB UR QL STRIP: ABNORMAL
INTERPRETATION, 910389: NORMAL
KETONES UR QL STRIP: NEGATIVE
LDLC SERPL CALC-MCNC: 126 MG/DL (ref 0–99)
LEUKOCYTE ESTERASE UR QL STRIP: NEGATIVE
MCH RBC QN AUTO: 29 PG (ref 26.6–33)
MCHC RBC AUTO-ENTMCNC: 31.3 G/DL (ref 31.5–35.7)
MCV RBC AUTO: 93 FL (ref 79–97)
MICRO URNS: ABNORMAL
MUCOUS THREADS URNS QL MICRO: PRESENT
NITRITE UR QL STRIP: POSITIVE
PH UR STRIP: 5.5 [PH] (ref 5–7.5)
PLATELET # BLD AUTO: 216 X10E3/UL (ref 150–450)
POTASSIUM SERPL-SCNC: 4.3 MMOL/L (ref 3.5–5.2)
PROT SERPL-MCNC: 7 G/DL (ref 6–8.5)
PROT UR QL STRIP: NEGATIVE
RBC # BLD AUTO: 4.42 X10E6/UL (ref 3.77–5.28)
RBC #/AREA URNS HPF: NORMAL /HPF (ref 0–2)
SODIUM SERPL-SCNC: 145 MMOL/L (ref 134–144)
SP GR UR: 1.02 (ref 1–1.03)
TRIGL SERPL-MCNC: 181 MG/DL (ref 0–149)
TSH SERPL DL<=0.005 MIU/L-ACNC: 2.92 UIU/ML (ref 0.45–4.5)
UREA BREATH TEST QL: NEGATIVE
UROBILINOGEN UR STRIP-MCNC: 0.2 MG/DL (ref 0.2–1)
VLDLC SERPL CALC-MCNC: 36 MG/DL (ref 5–40)
WBC # BLD AUTO: 4.6 X10E3/UL (ref 3.4–10.8)
WBC #/AREA URNS HPF: NORMAL /HPF (ref 0–5)

## 2020-07-23 ENCOUNTER — TELEPHONE (OUTPATIENT)
Dept: FAMILY MEDICINE CLINIC | Age: 65
End: 2020-07-23

## 2020-07-23 DIAGNOSIS — N39.0 URINARY TRACT INFECTION WITHOUT HEMATURIA, SITE UNSPECIFIED: Primary | ICD-10-CM

## 2020-07-23 RX ORDER — NITROFURANTOIN 25; 75 MG/1; MG/1
100 CAPSULE ORAL 2 TIMES DAILY
Qty: 10 CAP | Refills: 0 | Status: SHIPPED | OUTPATIENT
Start: 2020-07-23

## 2020-07-23 NOTE — TELEPHONE ENCOUNTER
Called to communicate lab results. Spoke with daughter. UTI noted- will call macrobid for UTI. No symptoms according to daughter. Will send letter with lab results too.

## 2021-10-01 ENCOUNTER — OFFICE VISIT (OUTPATIENT)
Dept: ORTHOPEDIC SURGERY | Age: 66
End: 2021-10-01
Payer: MEDICARE

## 2021-10-01 VITALS
WEIGHT: 113 LBS | HEART RATE: 67 BPM | HEIGHT: 61 IN | BODY MASS INDEX: 21.34 KG/M2 | OXYGEN SATURATION: 99 % | RESPIRATION RATE: 16 BRPM | TEMPERATURE: 98.1 F

## 2021-10-01 DIAGNOSIS — M79.641 RIGHT HAND PAIN: ICD-10-CM

## 2021-10-01 DIAGNOSIS — M19.032 PRIMARY OSTEOARTHRITIS OF LEFT WRIST: Primary | ICD-10-CM

## 2021-10-01 DIAGNOSIS — M25.649 STIFFNESS OF HAND JOINT, UNSPECIFIED LATERALITY: ICD-10-CM

## 2021-10-01 DIAGNOSIS — M79.642 LEFT HAND PAIN: ICD-10-CM

## 2021-10-01 PROCEDURE — 1090F PRES/ABSN URINE INCON ASSESS: CPT | Performed by: ORTHOPAEDIC SURGERY

## 2021-10-01 PROCEDURE — G8427 DOCREV CUR MEDS BY ELIG CLIN: HCPCS | Performed by: ORTHOPAEDIC SURGERY

## 2021-10-01 PROCEDURE — 1101F PT FALLS ASSESS-DOCD LE1/YR: CPT | Performed by: ORTHOPAEDIC SURGERY

## 2021-10-01 PROCEDURE — 99203 OFFICE O/P NEW LOW 30 MIN: CPT | Performed by: ORTHOPAEDIC SURGERY

## 2021-10-01 PROCEDURE — 20605 DRAIN/INJ JOINT/BURSA W/O US: CPT | Performed by: ORTHOPAEDIC SURGERY

## 2021-10-01 PROCEDURE — G8400 PT W/DXA NO RESULTS DOC: HCPCS | Performed by: ORTHOPAEDIC SURGERY

## 2021-10-01 PROCEDURE — 73130 X-RAY EXAM OF HAND: CPT | Performed by: ORTHOPAEDIC SURGERY

## 2021-10-01 PROCEDURE — G8536 NO DOC ELDER MAL SCRN: HCPCS | Performed by: ORTHOPAEDIC SURGERY

## 2021-10-01 PROCEDURE — G8420 CALC BMI NORM PARAMETERS: HCPCS | Performed by: ORTHOPAEDIC SURGERY

## 2021-10-01 PROCEDURE — 3017F COLORECTAL CA SCREEN DOC REV: CPT | Performed by: ORTHOPAEDIC SURGERY

## 2021-10-01 PROCEDURE — G8432 DEP SCR NOT DOC, RNG: HCPCS | Performed by: ORTHOPAEDIC SURGERY

## 2021-10-01 NOTE — PROGRESS NOTES
Diamond Curran is a 76 y.o. female right handed retiree. Worker's Compensation and legal considerations: none filed. Vitals:    10/01/21 0918   Pulse: 67   Resp: 16   Temp: 98.1 °F (36.7 °C)   TempSrc: Temporal   SpO2: 99%   Weight: 113 lb (51.3 kg)   Height: 5' 1\" (1.549 m)   PainSc:   4   PainLoc: Wrist           Chief Complaint   Patient presents with    Wrist Pain     bilateral wrist pain         HPI: Patient presents today with her daughter with complaints of bilateral hand pain and stiffness that is worse during the day. She also reports left wrist pain. Date of onset: Chronic    Injury: No    Prior Treatment:  No    Numbness/ Tingling: No      ROS: Review of Systems - General ROS: negative  Psychological ROS: negative  ENT ROS: negative  Allergy and Immunology ROS: negative  Hematological and Lymphatic ROS: negative  Respiratory ROS: no cough, shortness of breath, or wheezing  Cardiovascular ROS: no chest pain or dyspnea on exertion  Gastrointestinal ROS: no abdominal pain, change in bowel habits, or black or bloody stools  Musculoskeletal ROS: negative  Neurological ROS: negative  Dermatological ROS: negative    Past Medical History:   Diagnosis Date    High cholesterol     Hypothyroidism     Trigeminal neuralgia of right side of face October 2014    V3 Branch       Past Surgical History:   Procedure Laterality Date    HX ORTHOPAEDIC      LEFT KNEE       Current Outpatient Medications   Medication Sig Dispense Refill    glucosamine-chondroitin (ARTHX) 500-400 mg cap Take 1 Capsule by mouth daily.  nitrofurantoin, macrocrystal-monohydrate, (MACROBID) 100 mg capsule Take 1 Cap by mouth two (2) times a day. (Patient not taking: Reported on 10/1/2021) 10 Cap 0    chlorhexidine (PERIDEX) 0.12 % solution RINSE WITH 5ML FOR 30 SECONDS TWICE A DAY OR AS NEEDED (Patient not taking: Reported on 10/1/2021)         No Known Allergies        PE:     Physical Exam  Vitals and nursing note reviewed. Constitutional:       General: She is not in acute distress. Appearance: Normal appearance. She is not ill-appearing. Cardiovascular:      Pulses: Normal pulses. Pulmonary:      Effort: Pulmonary effort is normal. No respiratory distress. Musculoskeletal:         General: Swelling and tenderness present. No deformity or signs of injury. Normal range of motion. Cervical back: Normal range of motion and neck supple. Right lower leg: No edema. Left lower leg: No edema. Skin:     General: Skin is warm and dry. Capillary Refill: Capillary refill takes less than 2 seconds. Neurological:      General: No focal deficit present. Mental Status: She is alert and oriented to person, place, and time. Psychiatric:         Mood and Affect: Mood normal.         Behavior: Behavior normal.            Bilateral upper extremities: Tenderness about the wrist joint dorsally as well as diffuse tenderness about bilateral hands. Range of motion near full. Neurovascularly intact. Pain is worsened about the wrist with supination and pronation. Imaging:     10/1/2021 3 views of bilateral hands shows moderate degenerative changes throughout the small joints with no erosive characteristics seen. ICD-10-CM ICD-9-CM    1. Primary osteoarthritis of left wrist  M19.032 715.13 REFERRAL TO OCCUPATIONAL THERAPY      triamcinolone acetonide (KENALOG) 10 mg/mL injection 5 mg      DRAIN/INJECT INTERMEDIATE JOINT/BURSA   2. Stiffness of hand joint, unspecified laterality  M25.649 719.54 REFERRAL TO OCCUPATIONAL THERAPY   3. Right hand pain  M79.641 729.5 AMB POC XRAY, HAND; 3+ VIEWS   4. Left hand pain  M79.642 729.5 AMB POC XRAY, HAND; 3+ VIEWS         Plan:     Left wrist joint injection. OT for range of motion exercises, edema control, and other modalities. Follow-up and Dispositions    · Return in about 3 months (around 1/1/2022) for Reevaluation, OT follow-up, and injection follow-up. Plan was reviewed with patient, who verbalized agreement and understanding of the plan    [address] [facility] NOTE        Chart reviewed for the following:   Eddi BEACH DO, have reviewed the History, Physical and updated the Allergic reactions for Ronaldo Peppers performed immediately prior to start of procedure:   Eddi BEACH DO, have performed the following reviews on Nicole Hagana prior to the start of the procedure:            * Patient was identified by name and date of birth   * Agreement on procedure being performed was verified  * Risks and Benefits explained to the patient  * Procedure site verified and marked as necessary  * Patient was positioned for comfort  * Consent was signed and verified     Time: 09:57 AM      Date of procedure: 10/1/2021    Procedure performed by: Tasneem Payan DO    Provider assisted by: James Castro MA    Patient assisted by: self    How tolerated by patient: tolerated the procedure well with no complications    Post Procedural Pain Scale: 0 - No Hurt    Comments: none    Procedure:  After consent was obtained, using sterile technique the left wrist was prepped. Local anesthetic used: 1% lidocaine. Kenalog 5 mg and was then injected and the needle withdrawn. The procedure was well tolerated. The patient is asked to continue to rest the area for a few more days before resuming regular activities. It may be more painful for the first 1-2 days. Watch for fever, or increased swelling or persistent pain in the joint. Call or return to clinic prn if such symptoms occur or there is failure to improve as anticipated.

## 2021-11-05 ENCOUNTER — OFFICE VISIT (OUTPATIENT)
Dept: ORTHOPEDIC SURGERY | Age: 66
End: 2021-11-05
Payer: MEDICARE

## 2021-11-05 VITALS
BODY MASS INDEX: 21.67 KG/M2 | WEIGHT: 114.8 LBS | OXYGEN SATURATION: 97 % | TEMPERATURE: 98.3 F | HEIGHT: 61 IN | HEART RATE: 69 BPM

## 2021-11-05 DIAGNOSIS — M77.11 RIGHT LATERAL EPICONDYLITIS: Primary | ICD-10-CM

## 2021-11-05 PROCEDURE — G8420 CALC BMI NORM PARAMETERS: HCPCS | Performed by: ORTHOPAEDIC SURGERY

## 2021-11-05 PROCEDURE — 1090F PRES/ABSN URINE INCON ASSESS: CPT | Performed by: ORTHOPAEDIC SURGERY

## 2021-11-05 PROCEDURE — G8427 DOCREV CUR MEDS BY ELIG CLIN: HCPCS | Performed by: ORTHOPAEDIC SURGERY

## 2021-11-05 PROCEDURE — G8432 DEP SCR NOT DOC, RNG: HCPCS | Performed by: ORTHOPAEDIC SURGERY

## 2021-11-05 PROCEDURE — G8536 NO DOC ELDER MAL SCRN: HCPCS | Performed by: ORTHOPAEDIC SURGERY

## 2021-11-05 PROCEDURE — 1101F PT FALLS ASSESS-DOCD LE1/YR: CPT | Performed by: ORTHOPAEDIC SURGERY

## 2021-11-05 PROCEDURE — 99214 OFFICE O/P EST MOD 30 MIN: CPT | Performed by: ORTHOPAEDIC SURGERY

## 2021-11-05 PROCEDURE — 3017F COLORECTAL CA SCREEN DOC REV: CPT | Performed by: ORTHOPAEDIC SURGERY

## 2021-11-05 PROCEDURE — G8400 PT W/DXA NO RESULTS DOC: HCPCS | Performed by: ORTHOPAEDIC SURGERY

## 2021-11-05 NOTE — PROGRESS NOTES
Ashley Banegas is a 78 y.o. female right handed retiree. Worker's Compensation and legal considerations: none filed. Vitals:    11/05/21 0928   Pulse: 69   Temp: 98.3 °F (36.8 °C)   SpO2: 97%   Weight: 114 lb 12.8 oz (52.1 kg)   Height: 5' 1\" (1.549 m)   PainSc:   9   PainLoc: Arm           Chief Complaint   Patient presents with    Arm Pain     right       HPI: Patient presents today for follow-up and a new problem of right elbow pain. Her previous left wrist injection has significantly improved. She reports continued pain at the elbow. Initial HPI: Patient presents today with her daughter with complaints of bilateral hand pain and stiffness that is worse during the day. She also reports left wrist pain. Date of onset: Chronic    Injury: No    Prior Treatment:  Yes: Comment: Left wrist joint injection    Numbness/ Tingling: No      ROS: Review of Systems - General ROS: negative  Psychological ROS: negative  ENT ROS: negative  Allergy and Immunology ROS: negative  Hematological and Lymphatic ROS: negative  Respiratory ROS: no cough, shortness of breath, or wheezing  Cardiovascular ROS: no chest pain or dyspnea on exertion  Gastrointestinal ROS: no abdominal pain, change in bowel habits, or black or bloody stools  Musculoskeletal ROS: negative  Neurological ROS: negative  Dermatological ROS: negative    Past Medical History:   Diagnosis Date    High cholesterol     Hypothyroidism     Right arm pain     Trigeminal neuralgia of right side of face October 2014    V3 Branch       Past Surgical History:   Procedure Laterality Date    HX ORTHOPAEDIC      LEFT KNEE       Current Outpatient Medications   Medication Sig Dispense Refill    glucosamine-chondroitin (ARTHX) 500-400 mg cap Take 1 Capsule by mouth daily.  nitrofurantoin, macrocrystal-monohydrate, (MACROBID) 100 mg capsule Take 1 Cap by mouth two (2) times a day.  (Patient not taking: Reported on 10/1/2021) 10 Cap 0    chlorhexidine (PERIDEX) 0.12 % solution RINSE WITH 5ML FOR 30 SECONDS TWICE A DAY OR AS NEEDED (Patient not taking: Reported on 10/1/2021)         No Known Allergies        PE:     Physical Exam  Vitals and nursing note reviewed. Constitutional:       General: She is not in acute distress. Appearance: Normal appearance. She is not ill-appearing. Cardiovascular:      Pulses: Normal pulses. Pulmonary:      Effort: Pulmonary effort is normal. No respiratory distress. Musculoskeletal:         General: Swelling and tenderness present. No deformity or signs of injury. Normal range of motion. Cervical back: Normal range of motion and neck supple. Right lower leg: No edema. Left lower leg: No edema. Skin:     General: Skin is warm and dry. Capillary Refill: Capillary refill takes less than 2 seconds. Neurological:      General: No focal deficit present. Mental Status: She is alert and oriented to person, place, and time. Psychiatric:         Mood and Affect: Mood normal.         Behavior: Behavior normal.            ELBOW:    Examination L R   Tender Lat. Epi. - +   Resisted Wrist Ext. - +   Resisted Finger Ext. - +   Resisted Supination - +   Tender Med Epi. - -   Resisted Wrist Flex. - -   Resisted Finger Ext. - -   Resisted Pronation - -   PIN Compression - -     ROM: Full          Imaging:     10/1/2021 3 views of bilateral hands shows moderate degenerative changes throughout the small joints with no erosive characteristics seen. ICD-10-CM ICD-9-CM    1. Right lateral epicondylitis  M77.11 726.32 AMB SUPPLY ORDER         Plan:     Right tennis elbow band. I instructed patient on tennis elbow exercises and gave her a handout. Follow-up and Dispositions    · Return if symptoms worsen or fail to improve.           Plan was reviewed with patient, who verbalized agreement and understanding of the plan    [address] [facility] NOTE        Chart reviewed for the following:   Eddi BEACH DO, have reviewed the History, Physical and updated the Allergic reactions for Karlo Herrera performed immediately prior to start of procedure:   Eddi BEACH DO, have performed the following reviews on Henrry Romo prior to the start of the procedure:            * Patient was identified by name and date of birth   * Agreement on procedure being performed was verified  * Risks and Benefits explained to the patient  * Procedure site verified and marked as necessary  * Patient was positioned for comfort  * Consent was signed and verified     Time: 09:57 AM      Date of procedure: 11/5/2021    Procedure performed by: Tad Polanco DO    Provider assisted by: Simin Bland MA    Patient assisted by: self    How tolerated by patient: tolerated the procedure well with no complications    Post Procedural Pain Scale: 0 - No Hurt    Comments: none    Procedure:  After consent was obtained, using sterile technique the left wrist was prepped. Local anesthetic used: 1% lidocaine. Kenalog 5 mg and was then injected and the needle withdrawn. The procedure was well tolerated. The patient is asked to continue to rest the area for a few more days before resuming regular activities. It may be more painful for the first 1-2 days. Watch for fever, or increased swelling or persistent pain in the joint. Call or return to clinic prn if such symptoms occur or there is failure to improve as anticipated.

## 2021-11-05 NOTE — PATIENT INSTRUCTIONS
Tennis Elbow: Exercises  Introduction  Here are some examples of exercises for you to try. The exercises may be suggested for a condition or for rehabilitation. Start each exercise slowly. Ease off the exercises if you start to have pain. You will be told when to start these exercises and which ones will work best for you. How to do the exercises  Wrist flexor stretch    1. Extend your arm in front of you with your palm up. 2. Bend your wrist, pointing your hand toward the floor. 3. With your other hand, gently bend your wrist farther until you feel a mild to moderate stretch in your forearm. 4. Hold for at least 15 to 30 seconds. Repeat 2 to 4 times. Wrist extensor stretch    1. Repeat steps 1 to 4 of the stretch above but begin with your extended hand palm down. Ball or sock squeeze    1. Hold a tennis ball (or a rolled-up sock) in your hand. 2. Make a fist around the ball (or sock) and squeeze. 3. Hold for about 6 seconds, and then relax for up to 10 seconds. 4. Repeat 8 to 12 times. 5. Switch the ball (or sock) to your other hand and do 8 to 12 times. Wrist deviation    1. Sit so that your arm is supported but your hand hangs off the edge of a flat surface, such as a table. 2. Hold your hand out like you are shaking hands with someone. 3. Move your hand up and down. 4. Repeat this motion 8 to 12 times. 5. Switch arms. 6. Try to do this exercise twice with each hand. Wrist curls    1. Place your forearm on a table with your hand hanging over the edge of the table, palm up. 2. Place a 1- to 2-pound weight in your hand. This may be a dumbbell, a can of food, or a filled water bottle. 3. Slowly raise and lower the weight while keeping your forearm on the table and your palm facing up. 4. Repeat this motion 8 to 12 times. 5. Switch arms, and do steps 1 through 4.  6. Repeat with your hand facing down toward the floor. Switch arms. Biceps curls    1.  Sit leaning forward with your legs slightly spread and your left hand on your left thigh. 2. Place your right elbow on your right thigh, and hold the weight with your forearm horizontal.  3. Slowly curl the weight up and toward your chest.  4. Repeat this motion 8 to 12 times. 5. Switch arms, and do steps 1 through 4. Follow-up care is a key part of your treatment and safety. Be sure to make and go to all appointments, and call your doctor if you are having problems. It's also a good idea to know your test results and keep a list of the medicines you take. Where can you learn more? Go to http://www.gray.com/  Enter V195 in the search box to learn more about \"Tennis Elbow: Exercises. \"  Current as of: July 1, 2021               Content Version: 13.0  © 2006-2021 [employer]. Care instructions adapted under license by [employer] (which disclaims liability or warranty for this information). If you have questions about a medical condition or this instruction, always ask your healthcare professional. [address] any warranty or liability for your use of this information.

## 2022-03-18 PROBLEM — E03.9 ACQUIRED HYPOTHYROIDISM: Status: ACTIVE | Noted: 2019-01-11

## 2023-02-24 ENCOUNTER — OFFICE VISIT (OUTPATIENT)
Age: 68
End: 2023-02-24

## 2023-02-24 VITALS
WEIGHT: 116.2 LBS | HEART RATE: 96 BPM | BODY MASS INDEX: 21.94 KG/M2 | OXYGEN SATURATION: 96 % | RESPIRATION RATE: 18 BRPM | HEIGHT: 61 IN

## 2023-02-24 DIAGNOSIS — M23.91 INTERNAL DERANGEMENT OF RIGHT KNEE: ICD-10-CM

## 2023-02-24 DIAGNOSIS — M25.561 ACUTE PAIN OF RIGHT KNEE: Primary | ICD-10-CM

## 2023-02-24 RX ORDER — TRIAMCINOLONE ACETONIDE 40 MG/ML
80 INJECTION, SUSPENSION INTRA-ARTICULAR; INTRAMUSCULAR ONCE
Status: COMPLETED | OUTPATIENT
Start: 2023-02-24 | End: 2023-02-24

## 2023-02-24 RX ADMIN — TRIAMCINOLONE ACETONIDE 80 MG: 40 INJECTION, SUSPENSION INTRA-ARTICULAR; INTRAMUSCULAR at 09:48

## 2023-02-24 NOTE — PROGRESS NOTES
Name:   Fern Infante  Reason for Visit:   Knee Pain (Right knee)     Age:  75 y.o. How long: couple of days   Weight:  Wt Readings from Last 3 Encounters:   02/24/23 116 lb 3.2 oz (52.7 kg)   11/05/21 114 lb 12.8 oz (52.1 kg)   10/01/21 113 lb (51.3 kg)     BMI:   Body mass index is 21.96 kg/m². SpOx:  96   Pulse:  Heart Rate:  [96]        Blood Thinners:  None NSAIDs:  None   Tobacco:  None Topicals:  None   Illicit Drugs:  None Pain Medications:  None     Injections:  None Injury:   no     Interventions:  no    Surgeries:  no    Pain Score: 9    Fell a month ago walking the dog. No pain when fell  Walk the dog a couple days and pain in the knee.    Uses heating pad but does not help  Sitting to long pain becomes stiff

## 2023-02-24 NOTE — PROGRESS NOTES
Patient: Esdras Maldonado                MRN: [id number]       SSN: [id number]  YOB: 1955        AGE: 75 y.o. SEX: female    PCP: Soren Barriga MD  02/24/23    Chief Complaint: Knee Pain (Right knee)      Esdras Maldonado is a 75 y.o. female  1. Acute pain of right knee  -     AMB POC XRAY, KNEE; COMPLETE, 4+ VIEW  2. Internal derangement of right knee  Assessment & Plan:  40-year-old female was walking her dog about a month ago when she fell and she had a bruise in the anterior aspect of her knee. She had no immediate pain but over the next couple weeks increasing pain developed over the medial aspect of her knee. This is to the point where she has some difficulty walking at this point. She has most difficulty when she first gets up from a seated position or when he first gets up from bed in the morning. On physical exam she has joint line pain medially and pain with Griselda's without a palpable click. She has had a previous meniscus tear on the left that was treated surgically in an suspicious of a medial meniscus tear on her right side. I will get an MRI. I offered her a corticosteroid injection to the right knee today and she would like to go forward with that. I will have her back after the MRI has been completed. After obtaining written consent for right knee intra-articular injection the patient was prepped in normal sterile fashion with freeze spray and alcohol. 80mg kenalog and 2mL 2% lidocaine was injected . The needle was withdrawn, the area was cleansed and a sterile bandage was applied. The patient tolerated the procedure well. Orders:  -     DRAIN/INJECT LARGE JOINT/BURSA  -     triamcinolone acetonide (KENALOG-40) injection 80 mg; 80 mg, IntraMUSCular, ONCE, 1 dose, On Fri 2/24/23 at [address]; Future      HPI: 40-year-old female with right knee pain. She is here with her daughter who acts as  for her.     AMB PAIN ASSESSMENT 2/24/2023   Location of Pain Knee   Location Modifiers Right   Severity of Pain 9     Tobacco Use: Low Risk     Smoking Tobacco Use: Never    Smokeless Tobacco Use: Never    Passive Exposure: Not on file           PHYSICAL EXAMINATION:  Pulse 96   Resp 18   Ht 5' 1\" (1.549 m)   Wt 116 lb 3.2 oz (52.7 kg)   SpO2 96%   BMI 21.96 kg/m²   Body mass index is 21.96 kg/m². Wt Readings from Last 3 Encounters:   02/24/23 116 lb 3.2 oz (52.7 kg)   11/05/21 114 lb 12.8 oz (52.1 kg)   10/01/21 113 lb (51.3 kg)       GENERAL: Alert and oriented x3, in no acute distress. HEENT: Normocephalic, atraumatic. MSK: Right Knee Exam     Tenderness   The patient is experiencing tenderness in the medial joint line. Range of Motion   Extension:  0   Flexion:  130     Tests   Griselda:  Medial - negative   Varus: negative Valgus: negative  Lachman:  Anterior - negative      Drawer:  Anterior - negative    Posterior - negative    Other   Erythema: absent  Sensation: normal  Pulse: present  Swelling: mild  Effusion: effusion present           IMAGING:  Imaging read by myself and interpreted as follows:    February 24, 2023:  4 view x-ray of the right knee including AP, lateral, sunrise and notch views demonstrates some possible medial joint space narrowing but essentially preserved joint space without any subchondral sclerosis or cyst formation. There is no other bony abnormalities but there is some mild lateral patellar tilt. Past Medical History:   Diagnosis Date    High cholesterol     Hypothyroidism     Right arm pain     Trigeminal neuralgia of right side of face October 2014    V3 Branch       No family history on file.     Current Outpatient Medications   Medication Sig Dispense Refill    chlorhexidine (PERIDEX) 0.12 % solution RINSE WITH 5ML FOR 30 SECONDS TWICE A DAY OR AS NEEDED      glucosamine-chondroitin 500-400 MG CAPS Take 1 capsule by mouth daily      nitrofurantoin, macrocrystal-monohydrate, (MACROBID) 100 MG capsule Take 100 mg by mouth 2 times daily       Current Facility-Administered Medications   Medication Dose Route Frequency Provider Last Rate Last Admin    triamcinolone acetonide (KENALOG-40) injection 80 mg  80 mg IntraMUSCular Once Clarita Mata DO            No Known Allergies    Past Surgical History:   Procedure Laterality Date    ORTHOPEDIC SURGERY      LEFT KNEE       Social History     Socioeconomic History    Marital status:      Spouse name: Not on file    Number of children: Not on file    Years of education: Not on file    Highest education level: Not on file   Occupational History    Not on file   Tobacco Use    Smoking status: Never    Smokeless tobacco: Never   Substance and Sexual Activity    Alcohol use: No    Drug use: No    Sexual activity: Not on file   Other Topics Concern    Not on file   Social History Narrative    Not on file     Social Determinants of Health     Financial Resource Strain: Not on file   Food Insecurity: Not on file   Transportation Needs: Not on file   Physical Activity: Not on file   Stress: Not on file   Social Connections: Not on file   Intimate Partner Violence: Not on file   Housing Stability: Not on file       REVIEW OF SYSTEMS:      No changes from previous review of systems unless noted. Prescription medication management discussed with patient. Electronically signed by: Clarita Mata DO    Note: This note was completed using voice recognition software.   Any typographical/name errors or mistakes are unintentional.

## 2023-02-24 NOTE — ASSESSMENT & PLAN NOTE
58-year-old female was walking her dog about a month ago when she fell and she had a bruise in the anterior aspect of her knee. She had no immediate pain but over the next couple weeks increasing pain developed over the medial aspect of her knee. This is to the point where she has some difficulty walking at this point. She has most difficulty when she first gets up from a seated position or when he first gets up from bed in the morning. On physical exam she has joint line pain medially and pain with Griselda's without a palpable click. She has had a previous meniscus tear on the left that was treated surgically in an suspicious of a medial meniscus tear on her right side. I will get an MRI. I offered her a corticosteroid injection to the right knee today and she would like to go forward with that. I will have her back after the MRI has been completed. After obtaining written consent for right knee intra-articular injection the patient was prepped in normal sterile fashion with freeze spray and alcohol. 80mg kenalog and 2mL 2% lidocaine was injected . The needle was withdrawn, the area was cleansed and a sterile bandage was applied. The patient tolerated the procedure well.

## 2023-03-20 ENCOUNTER — HOSPITAL ENCOUNTER (OUTPATIENT)
Facility: HOSPITAL | Age: 68
Discharge: HOME | End: 2023-03-23
Payer: MEDICARE

## 2023-03-20 DIAGNOSIS — M23.91 INTERNAL DERANGEMENT OF RIGHT KNEE: ICD-10-CM

## 2023-03-20 PROCEDURE — 73721 MRI JNT OF LWR EXTRE W/O DYE: CPT

## 2023-05-25 ENCOUNTER — OFFICE VISIT (OUTPATIENT)
Age: 68
End: 2023-05-25

## 2023-05-25 VITALS — HEIGHT: 61 IN | BODY MASS INDEX: 21.71 KG/M2 | WEIGHT: 115 LBS | TEMPERATURE: 97.9 F

## 2023-05-25 DIAGNOSIS — M19.042 PRIMARY OSTEOARTHRITIS OF LEFT HAND: ICD-10-CM

## 2023-05-25 DIAGNOSIS — M65.322 TRIGGER INDEX FINGER OF LEFT HAND: Primary | ICD-10-CM

## 2023-05-25 NOTE — PROGRESS NOTES
Federico Chew is a 75 y.o. female right handed retiree. Worker's Compensation and legal considerations: none    Chief Complaint   Patient presents with    Hand Pain     Left hand pain    Finger Pain     Pointer finger pain     Pain Score:   6    HPI: Patient presents today with complaints of left index finger pain and locking she also reports some soreness in the hand. Date of onset: Approximately March 2023  Injury: No  Prior Treatment:  No    ROS: Review of Systems - General ROS: negative except HPI    Past Medical History:   Diagnosis Date    High cholesterol     Hypothyroidism     Right arm pain     Trigeminal neuralgia of right side of face October 2014    V3 Branch       Past Surgical History:   Procedure Laterality Date    ORTHOPEDIC SURGERY      LEFT KNEE        Current Outpatient Medications   Medication Sig Dispense Refill    chlorhexidine (PERIDEX) 0.12 % solution RINSE WITH 5ML FOR 30 SECONDS TWICE A DAY OR AS NEEDED      glucosamine-chondroitin 500-400 MG CAPS Take 1 capsule by mouth daily      nitrofurantoin, macrocrystal-monohydrate, (MACROBID) 100 MG capsule Take 100 mg by mouth 2 times daily       Current Facility-Administered Medications   Medication Dose Route Frequency Provider Last Rate Last Admin    triamcinolone acetonide (KENALOG) injection 5 mg  5 mg Intra-LESional Once Gary A Rubens, DO           No Known Allergies      Temp 97.9 °F (36.6 °C) (Temporal)   Ht 5' 1\" (1.549 m)   Wt 115 lb (52.2 kg)   BMI 21.73 kg/m²   Physical Exam  Vitals and nursing note reviewed. Constitutional:       General: She is not in acute distress. Appearance: Normal appearance. She is not ill-appearing. Cardiovascular:      Pulses: Normal pulses. Pulmonary:      Effort: Pulmonary effort is normal. No respiratory distress. Musculoskeletal:         General: Tenderness present. No swelling, deformity or signs of injury. Normal range of motion. Cervical back: Normal range of motion and neck supple.